# Patient Record
Sex: MALE | Race: WHITE
[De-identification: names, ages, dates, MRNs, and addresses within clinical notes are randomized per-mention and may not be internally consistent; named-entity substitution may affect disease eponyms.]

---

## 2019-04-03 ENCOUNTER — HOSPITAL ENCOUNTER (INPATIENT)
Dept: HOSPITAL 19 - INPTSU | Age: 54
LOS: 3 days | Discharge: HOME | DRG: 658 | End: 2019-04-06
Attending: UROLOGY | Admitting: UROLOGY
Payer: OTHER GOVERNMENT

## 2019-04-03 VITALS — HEIGHT: 74 IN | WEIGHT: 249.12 LBS | BODY MASS INDEX: 31.97 KG/M2

## 2019-04-03 DIAGNOSIS — C64.1: Primary | ICD-10-CM

## 2019-04-03 DIAGNOSIS — G47.30: ICD-10-CM

## 2019-04-03 DIAGNOSIS — Z72.0: ICD-10-CM

## 2019-04-03 DIAGNOSIS — K42.9: ICD-10-CM

## 2019-04-03 PROCEDURE — A9284 NON-ELECTRONIC SPIROMETER: HCPCS

## 2019-04-03 PROCEDURE — A4314 CATH W/DRAINAGE 2-WAY LATEX: HCPCS

## 2019-04-04 VITALS — TEMPERATURE: 98.5 F | HEART RATE: 64 BPM | SYSTOLIC BLOOD PRESSURE: 113 MMHG | DIASTOLIC BLOOD PRESSURE: 71 MMHG

## 2019-04-04 VITALS — HEART RATE: 83 BPM | DIASTOLIC BLOOD PRESSURE: 82 MMHG | SYSTOLIC BLOOD PRESSURE: 133 MMHG

## 2019-04-04 VITALS — HEART RATE: 77 BPM | SYSTOLIC BLOOD PRESSURE: 124 MMHG | DIASTOLIC BLOOD PRESSURE: 69 MMHG

## 2019-04-04 VITALS — DIASTOLIC BLOOD PRESSURE: 75 MMHG | SYSTOLIC BLOOD PRESSURE: 130 MMHG | HEART RATE: 82 BPM

## 2019-04-04 VITALS — DIASTOLIC BLOOD PRESSURE: 78 MMHG | HEART RATE: 76 BPM | SYSTOLIC BLOOD PRESSURE: 128 MMHG | TEMPERATURE: 98.7 F

## 2019-04-04 VITALS — DIASTOLIC BLOOD PRESSURE: 82 MMHG | HEART RATE: 72 BPM | SYSTOLIC BLOOD PRESSURE: 123 MMHG | TEMPERATURE: 98 F

## 2019-04-04 VITALS — HEART RATE: 76 BPM | DIASTOLIC BLOOD PRESSURE: 76 MMHG | SYSTOLIC BLOOD PRESSURE: 130 MMHG

## 2019-04-04 VITALS — HEART RATE: 76 BPM | DIASTOLIC BLOOD PRESSURE: 91 MMHG | SYSTOLIC BLOOD PRESSURE: 140 MMHG

## 2019-04-04 VITALS — SYSTOLIC BLOOD PRESSURE: 128 MMHG | DIASTOLIC BLOOD PRESSURE: 78 MMHG | HEART RATE: 76 BPM | TEMPERATURE: 98.7 F

## 2019-04-04 VITALS — HEART RATE: 80 BPM | SYSTOLIC BLOOD PRESSURE: 126 MMHG | DIASTOLIC BLOOD PRESSURE: 86 MMHG

## 2019-04-04 VITALS — HEART RATE: 71 BPM

## 2019-04-04 LAB
ANION GAP SERPL CALC-SCNC: 9 MMOL/L (ref 7–16)
BASOPHILS # BLD: 0 10*3/UL (ref 0–0.2)
BASOPHILS # BLD: 0 10*3/UL (ref 0–0.2)
BASOPHILS NFR BLD AUTO: 0.2 % (ref 0–2)
BASOPHILS NFR BLD AUTO: 0.4 % (ref 0–2)
BUN SERPL-MCNC: 14 MG/DL (ref 9–20)
CALCIUM SERPL-MCNC: 8.7 MG/DL (ref 8.4–10.2)
CHLORIDE SERPL-SCNC: 102 MMOL/L (ref 98–107)
CO2 SERPL-SCNC: 24 MMOL/L (ref 22–30)
CREAT SERPL-SCNC: 1.35 UMOL/L (ref 0.66–1.25)
EOSINOPHIL # BLD: 0 10*3/UL (ref 0–0.7)
EOSINOPHIL # BLD: 0.2 10*3/UL (ref 0–0.7)
EOSINOPHIL NFR BLD: 0.1 % (ref 0–4)
EOSINOPHIL NFR BLD: 1.9 % (ref 0–4)
ERYTHROCYTE [DISTWIDTH] IN BLOOD BY AUTOMATED COUNT: 13.2 % (ref 11.5–14.5)
ERYTHROCYTE [DISTWIDTH] IN BLOOD BY AUTOMATED COUNT: 13.3 % (ref 11.5–14.5)
GLUCOSE SERPL-MCNC: 215 MG/DL (ref 74–106)
GRANULOCYTES # BLD AUTO: 63.3 % (ref 42.2–75.2)
GRANULOCYTES # BLD AUTO: 90 % (ref 42.2–75.2)
HCT VFR BLD AUTO: 43.3 % (ref 42–52)
HCT VFR BLD AUTO: 46.9 % (ref 42–52)
HGB BLD-MCNC: 14.2 G/DL (ref 13.5–18)
HGB BLD-MCNC: 15.5 G/DL (ref 13.5–18)
LYMPHOCYTES # BLD: 0.6 10*3/UL (ref 1.2–3.4)
LYMPHOCYTES # BLD: 2 10*3/UL (ref 1.2–3.4)
LYMPHOCYTES NFR BLD: 23.7 % (ref 20–51)
LYMPHOCYTES NFR BLD: 5.1 % (ref 20–51)
MCH RBC QN AUTO: 28 PG (ref 27–31)
MCH RBC QN AUTO: 29 PG (ref 27–31)
MCHC RBC AUTO-ENTMCNC: 33 G/DL (ref 33–37)
MCHC RBC AUTO-ENTMCNC: 33 G/DL (ref 33–37)
MCV RBC AUTO: 86 FL (ref 80–100)
MCV RBC AUTO: 87 FL (ref 80–100)
MONOCYTES # BLD: 0.5 10*3/UL (ref 0.1–0.6)
MONOCYTES # BLD: 0.9 10*3/UL (ref 0.1–0.6)
MONOCYTES NFR BLD AUTO: 10.5 % (ref 1.7–9.3)
MONOCYTES NFR BLD AUTO: 4.1 % (ref 1.7–9.3)
NEUTROPHILS # BLD: 10.9 10*3/UL (ref 1.4–6.5)
NEUTROPHILS # BLD: 5.3 10*3/UL (ref 1.4–6.5)
PLATELET # BLD AUTO: 277 K/MM3 (ref 130–400)
PLATELET # BLD AUTO: 303 K/MM3 (ref 130–400)
PMV BLD AUTO: 8.7 FL (ref 7.4–10.4)
PMV BLD AUTO: 9 FL (ref 7.4–10.4)
POTASSIUM SERPL-SCNC: 4.2 MMOL/L (ref 3.4–5)
RBC # BLD AUTO: 4.97 M/MM3 (ref 4.2–5.6)
RBC # BLD AUTO: 5.46 M/MM3 (ref 4.2–5.6)
SODIUM SERPL-SCNC: 135 MMOL/L (ref 137–145)

## 2019-04-04 PROCEDURE — 0TT04ZZ RESECTION OF RIGHT KIDNEY, PERCUTANEOUS ENDOSCOPIC APPROACH: ICD-10-PCS | Performed by: UROLOGY

## 2019-04-04 PROCEDURE — 0WQF0ZZ REPAIR ABDOMINAL WALL, OPEN APPROACH: ICD-10-PCS | Performed by: UROLOGY

## 2019-04-04 NOTE — NUR
Report received from Joann HAMILTON. Patient rests in bed reports mild pain right
shoulder "gas pain". Epidural site CDI no redness,swelling or drainage.
Patient denies numbness or tingling to extremities. HS med reviewed and given.
Initially patient voices frustration for being on CLD and wants to know for
how long. Reports he's very hungry. Checked with surgical nurse and following
hernia surgeries patients are on CLD til am. Above reviewed with patient and
patient voices understanding. SCD's on. See assessment. Lung sound CTA
slightly diminished bases. Encouraged couph and deep breathing q hour while
awake. Instructed on hanson cath care and patient does independently with bath
wipe.

## 2019-04-04 NOTE — NUR
returned to room per bed, awake and alert, IV infusing per dial-a-flow and
will place on pump, SCDS on bilaterally, epidural intact and infusing at
5ml/hr, O2 on and O2 sat 96%, midline incision and 3 bandaids to abdomen CD&I,
full assessment completed, see interventions for further info

## 2019-04-04 NOTE — NUR
Pt resting in bed with eyes closed, side rails up and call light in reach.
Wife and son at bedside. Reported off to JOY David.

## 2019-04-04 NOTE — NUR
Pt ambulated in hallway, tolerated well. Mild shoulder discomfort, warm
blanket applied. Back to bed, eating jello and chicken broth. Wife and son at
bedside.

## 2019-04-04 NOTE — NUR
called nurse to room and he is c/o pain to bilateral clavicle area, states it
feels like it is coming around from his neck and did start when he sat up to
have broth, will check when he lies back if it gets better

## 2019-04-04 NOTE — NUR
dozes between checks, awakens easily, requestiong "something to eat", provided
kvng and broth, family at bedside

## 2019-04-04 NOTE — NUR
Patient admitted to room 7 ambulatory at 0633 and is alert and oriented.
Voices understanding of surgery and consents signed.  IV fluids started and
readied for surgery.  Spouse in room.

## 2019-04-05 VITALS — DIASTOLIC BLOOD PRESSURE: 67 MMHG | SYSTOLIC BLOOD PRESSURE: 114 MMHG | TEMPERATURE: 98.5 F | HEART RATE: 57 BPM

## 2019-04-05 VITALS — HEART RATE: 50 BPM | TEMPERATURE: 97.8 F | SYSTOLIC BLOOD PRESSURE: 127 MMHG | DIASTOLIC BLOOD PRESSURE: 71 MMHG

## 2019-04-05 VITALS — SYSTOLIC BLOOD PRESSURE: 102 MMHG | DIASTOLIC BLOOD PRESSURE: 57 MMHG | TEMPERATURE: 98 F | HEART RATE: 54 BPM

## 2019-04-05 VITALS — TEMPERATURE: 98 F | HEART RATE: 63 BPM | SYSTOLIC BLOOD PRESSURE: 111 MMHG | DIASTOLIC BLOOD PRESSURE: 71 MMHG

## 2019-04-05 VITALS — DIASTOLIC BLOOD PRESSURE: 66 MMHG | SYSTOLIC BLOOD PRESSURE: 109 MMHG | TEMPERATURE: 98.5 F | HEART RATE: 61 BPM

## 2019-04-05 LAB
ANION GAP SERPL CALC-SCNC: 10 MMOL/L (ref 7–16)
BASOPHILS # BLD: 0 10*3/UL (ref 0–0.2)
BASOPHILS NFR BLD AUTO: 0.1 % (ref 0–2)
BUN SERPL-MCNC: 19 MG/DL (ref 9–20)
CALCIUM SERPL-MCNC: 8.9 MG/DL (ref 8.4–10.2)
CHLORIDE SERPL-SCNC: 102 MMOL/L (ref 98–107)
CO2 SERPL-SCNC: 25 MMOL/L (ref 22–30)
CREAT SERPL-SCNC: 1.65 UMOL/L (ref 0.66–1.25)
EOSINOPHIL # BLD: 0 10*3/UL (ref 0–0.7)
EOSINOPHIL NFR BLD: 0.1 % (ref 0–4)
ERYTHROCYTE [DISTWIDTH] IN BLOOD BY AUTOMATED COUNT: 13.6 % (ref 11.5–14.5)
GLUCOSE SERPL-MCNC: 192 MG/DL (ref 74–106)
GRANULOCYTES # BLD AUTO: 73.1 % (ref 42.2–75.2)
HCT VFR BLD AUTO: 41.5 % (ref 42–52)
HGB BLD-MCNC: 13.3 G/DL (ref 13.5–18)
LYMPHOCYTES # BLD: 1.3 10*3/UL (ref 1.2–3.4)
LYMPHOCYTES NFR BLD: 13.2 % (ref 20–51)
MCH RBC QN AUTO: 29 PG (ref 27–31)
MCHC RBC AUTO-ENTMCNC: 32 G/DL (ref 33–37)
MCV RBC AUTO: 89 FL (ref 80–100)
MONOCYTES # BLD: 1.3 10*3/UL (ref 0.1–0.6)
MONOCYTES NFR BLD AUTO: 12.9 % (ref 1.7–9.3)
NEUTROPHILS # BLD: 7.1 10*3/UL (ref 1.4–6.5)
PLATELET # BLD AUTO: 272 K/MM3 (ref 130–400)
PMV BLD AUTO: 9.1 FL (ref 7.4–10.4)
POTASSIUM SERPL-SCNC: 4.7 MMOL/L (ref 3.4–5)
RBC # BLD AUTO: 4.64 M/MM3 (ref 4.2–5.6)
SODIUM SERPL-SCNC: 137 MMOL/L (ref 137–145)

## 2019-04-05 NOTE — NUR
Patient encouraged to sit up in chair this am. Walks around bed and to
recliner. States "what's the purpose of this I'm in the same position as I was
in bed yet now I'm uncomfortable". Reviewed importance of mobility and
prevention of pneumonia. Epidural site CDI.

## 2019-04-05 NOTE — NUR
TERENCE alvarez met with the patient and his wife, Lorie, to discuss discharge
planning. The patient lives in [place] with his wife and two sons. The patient
reports independence with ADLs and has no DME. The patients PCP is Dr. Kaylah Ontiveros and gets his medication from the pharmacy in [facility]. The patient
reports no difficulties obtaining his medication. The patient does not have
DPOA-HC in EMR but is interested in filling one out. TERENCE alvarez gave a copy to
the patient. The patient plans to return home with his wife upon discharge. No
additional needs at this time.

## 2019-04-05 NOTE — NUR
Patient resting in bed. Instructed to be out of bed 2 hours day of surgery and
to sit up in chair at this time. Instructed patient at 2000 to sit up in chair
but refused "no not now". Wife stays with patient through the night.

## 2019-04-05 NOTE — NUR
Patient assisted on a walk and educated on stopping the epidural and pulling
his hanson catheter. Questions answered. Patient stated some pain to midline
incision during walk. Midline incision clean, dry, and intact. 3 lap sites
above midline incision. Anesthesiologist ordered to hold Lovenox for possible
epidural removal.

## 2019-04-06 VITALS — SYSTOLIC BLOOD PRESSURE: 117 MMHG | DIASTOLIC BLOOD PRESSURE: 78 MMHG | TEMPERATURE: 98.1 F | HEART RATE: 58 BPM

## 2019-04-06 VITALS — DIASTOLIC BLOOD PRESSURE: 72 MMHG | TEMPERATURE: 98.4 F | HEART RATE: 63 BPM | SYSTOLIC BLOOD PRESSURE: 114 MMHG

## 2019-04-06 VITALS — SYSTOLIC BLOOD PRESSURE: 116 MMHG | DIASTOLIC BLOOD PRESSURE: 63 MMHG | TEMPERATURE: 98.3 F | HEART RATE: 69 BPM

## 2019-04-06 NOTE — NUR
DISCHARGE INSTRUCTIONS REVIEWED WITH PATIENT AND WIFE. ALL QUESTIONS ANSWERED.
ALL PATIENT PERSONAL BELONGINGS GATHERED. PATIENT TAKEN TO PERSONAL VEHICLE
VIA WHEELCHAIR BY SURGICAL STAFF. PATIENT DISCHARGED.

## 2019-04-06 NOTE — NUR
PATIENT IS DROWSY AND RESTING IN BED THIS MORNING. PATIENT AROUSES EASILY TO
NAME. PATIENT A&O. VSS. BOWEL SOUNDS ACTIVE ALL FOUR QUADRANTS. PATIENT
TOLERATING DIET WITHOUT COMPLAINTS OF N/V. MIDLINE ABDOMINAL INCISION DRESSED
WITH GAUZE & HYPAFIX AND IS CD&I. ABDOMINAL LAP SITES X3 DRESSED WITH BANDAIDS
AND ARE CD&I. POSITIVE PEDAL PULSES EQUAL BILATERALLY. LEFT HAND TO INT. K-PAD
TO ABDOMEN. PATIENT DENIES ANY OTHER NEEDS AT THIS TIME.

## 2019-04-06 NOTE — NUR
Pt slept well during the night, some C/O pain, given toradol which Pt states
is relieving the pain, VS have remained stable, Pt stated a desire to possible
return home this morning (4/6) Pt has been tolorating general diet and regular
ambulation in the hallway, he has had no problems urinating.

## 2021-11-09 NOTE — NUR
Pt in bed resting, some C/O pain around surgical sites, shift assessment
complete, left Pt call light in reach, bed in lowest position. Vaccine status unknown